# Patient Record
Sex: FEMALE | Race: OTHER | HISPANIC OR LATINO
[De-identification: names, ages, dates, MRNs, and addresses within clinical notes are randomized per-mention and may not be internally consistent; named-entity substitution may affect disease eponyms.]

---

## 2024-03-27 ENCOUNTER — NON-APPOINTMENT (OUTPATIENT)
Age: 39
End: 2024-03-27

## 2024-03-27 ENCOUNTER — APPOINTMENT (OUTPATIENT)
Dept: OBGYN | Facility: CLINIC | Age: 39
End: 2024-03-27
Payer: COMMERCIAL

## 2024-03-27 VITALS
DIASTOLIC BLOOD PRESSURE: 62 MMHG | OXYGEN SATURATION: 98 % | WEIGHT: 130 LBS | BODY MASS INDEX: 23.04 KG/M2 | HEART RATE: 77 BPM | SYSTOLIC BLOOD PRESSURE: 108 MMHG | HEIGHT: 63 IN

## 2024-03-27 DIAGNOSIS — Z83.3 FAMILY HISTORY OF DIABETES MELLITUS: ICD-10-CM

## 2024-03-27 DIAGNOSIS — Z80.3 FAMILY HISTORY OF MALIGNANT NEOPLASM OF BREAST: ICD-10-CM

## 2024-03-27 DIAGNOSIS — Z87.42 PERSONAL HISTORY OF OTHER DISEASES OF THE FEMALE GENITAL TRACT: ICD-10-CM

## 2024-03-27 DIAGNOSIS — Z87.09 PERSONAL HISTORY OF OTHER DISEASES OF THE RESPIRATORY SYSTEM: ICD-10-CM

## 2024-03-27 DIAGNOSIS — Z80.0 FAMILY HISTORY OF MALIGNANT NEOPLASM OF DIGESTIVE ORGANS: ICD-10-CM

## 2024-03-27 DIAGNOSIS — N60.19 DIFFUSE CYSTIC MASTOPATHY OF UNSPECIFIED BREAST: ICD-10-CM

## 2024-03-27 DIAGNOSIS — Z86.19 PERSONAL HISTORY OF OTHER INFECTIOUS AND PARASITIC DISEASES: ICD-10-CM

## 2024-03-27 DIAGNOSIS — Z01.419 ENCOUNTER FOR GYNECOLOGICAL EXAMINATION (GENERAL) (ROUTINE) W/OUT ABNORMAL FINDINGS: ICD-10-CM

## 2024-03-27 PROBLEM — Z00.00 ENCOUNTER FOR PREVENTIVE HEALTH EXAMINATION: Status: ACTIVE | Noted: 2024-03-27

## 2024-03-27 PROCEDURE — 99385 PREV VISIT NEW AGE 18-39: CPT

## 2024-03-27 NOTE — COUNSELING
[Contraception/ Emergency Contraception/ Safe Sexual Practices] : contraception, emergency contraception, safe sexual practices [HPV Vaccine] : HPV Vaccine [Lab Results] : lab results [Medication Management] : medication management

## 2024-03-27 NOTE — HISTORY OF PRESENT ILLNESS
[Patient reported PAP Smear was normal] : Patient reported PAP Smear was normal [Y] : Patient reports abnormal menses [N] : Patient denies prior pregnancies [Regular Cycle Intervals] : periods have been regular [Frequency: Q ___ days] : menstrual periods occur approximately every [unfilled] days [Menarche Age: ____] : age at menarche was [unfilled] [Previously active] : previously active [PapSmeardate] : 2017 [LMPDate] : 03/11/24 [MensesFreq] : 28 [MensesLength] : 5 [No] : never pregnant [TextBox_9] : 10 [Men] : men [FreeTextEntry1] : 03/11/24 [FreeTextEntry2] : in past used pull out

## 2024-03-27 NOTE — PHYSICAL EXAM
[Chaperone Present] : A chaperone was present in the examining room during all aspects of the physical examination [Appropriately responsive] : appropriately responsive [Alert] : alert [No Acute Distress] : no acute distress [Soft] : soft [Non-tender] : non-tender [Non-distended] : non-distended [No HSM] : No HSM [No Lesions] : no lesions [No Mass] : no mass [Oriented x3] : oriented x3 [Examination Of The Breasts] : a normal appearance [No Masses] : no breast masses were palpable [Labia Majora] : normal [Labia Minora] : normal [Normal] : normal [FreeTextEntry6] : cervix very deviated posteriorly (note has been told "uterus twisted") [Uterine Adnexae] : normal

## 2024-03-27 NOTE — PLAN
[FreeTextEntry1] : annual; Pap and hpv  not currently s/a last partner used: condoms/ withdrawal  hx of DVT on patch in past *refer to heme to get proper work up to see if clotting disorder dw pt to stay away from hormonal bc could consider copper iud

## 2024-03-28 LAB
C TRACH RRNA SPEC QL NAA+PROBE: NOT DETECTED
HPV HIGH+LOW RISK DNA PNL CVX: NOT DETECTED
N GONORRHOEA RRNA SPEC QL NAA+PROBE: NOT DETECTED
SOURCE TP AMPLIFICATION: NORMAL

## 2024-04-02 LAB — CYTOLOGY CVX/VAG DOC THIN PREP: NORMAL

## 2024-04-03 ENCOUNTER — APPOINTMENT (OUTPATIENT)
Dept: OBGYN | Facility: CLINIC | Age: 39
End: 2024-04-03
Payer: COMMERCIAL

## 2024-04-03 VITALS
DIASTOLIC BLOOD PRESSURE: 68 MMHG | WEIGHT: 130 LBS | HEIGHT: 63 IN | OXYGEN SATURATION: 97 % | SYSTOLIC BLOOD PRESSURE: 114 MMHG | HEART RATE: 86 BPM | BODY MASS INDEX: 23.04 KG/M2

## 2024-04-03 DIAGNOSIS — Z79.899 OTHER LONG TERM (CURRENT) DRUG THERAPY: ICD-10-CM

## 2024-04-03 DIAGNOSIS — N64.4 MASTODYNIA: ICD-10-CM

## 2024-04-03 DIAGNOSIS — D25.9 LEIOMYOMA OF UTERUS, UNSPECIFIED: ICD-10-CM

## 2024-04-03 PROCEDURE — 76830 TRANSVAGINAL US NON-OB: CPT

## 2024-04-03 PROCEDURE — 99213 OFFICE O/P EST LOW 20 MIN: CPT | Mod: 25

## 2024-04-03 NOTE — PROCEDURE
[Pelvic Mass] : pelvic mass [Fibroid Uterus] : fibroid uterus [Transvaginal Ultrasound] : transvaginal ultrasound [FreeTextEntry3] : large dominant posterior myoma , largest diameter 4.9 cm anterior to this normal uterus seen with normal EE no obvious adnexa masses [FreeTextEntry7] : follicles/ no obvious mass

## 2024-04-03 NOTE — HISTORY OF PRESENT ILLNESS
[FreeTextEntry1] : 39 yo patient presents for follow up : posterior pelvic fullness , here for sonogram and discussion

## 2024-04-03 NOTE — PHYSICAL EXAM
[Appropriately responsive] : appropriately responsive [Alert] : alert [No Acute Distress] : no acute distress [Soft] : soft [Non-tender] : non-tender [Non-distended] : non-distended [No Lesions] : no lesions [No HSM] : No HSM [No Mass] : no mass [Oriented x3] : oriented x3 [Labia Majora] : normal [Labia Minora] : normal [Normal] : normal [Uterine Adnexae] : normal [Enlarged ___ wks] : enlarged [unfilled] ~Uweeks [FreeTextEntry6] : posterior felt myoma: somewhat limited by pt tense abd muscles

## 2024-04-03 NOTE — PLAN
[FreeTextEntry1] : fibroid uterus:  imaging today max diameter 4.9cm d/w pt issues related to fibroid uterus. she states very long ago (10 plus years) she was told she had "tiny fibroid"  explained it is not "small" now max diameter appers to be 4.9 cm roughly 4.9 by 4.5 by 4.3 cm no obvious adnexa masses dw pt symptoms of fibroids: she denies any heavy menses/ pain mother had hysterectomy and then later both ovaries out: pt asked and mother stated it was NOT cancer  d/w pt myomas very rarely not benign, but to do short interval f/u here in a few months to make sure size is stable.

## 2024-04-08 ENCOUNTER — OUTPATIENT (OUTPATIENT)
Dept: OUTPATIENT SERVICES | Facility: HOSPITAL | Age: 39
LOS: 1 days | End: 2024-04-08

## 2024-04-08 ENCOUNTER — APPOINTMENT (OUTPATIENT)
Dept: MAMMOGRAPHY | Facility: CLINIC | Age: 39
End: 2024-04-08
Payer: COMMERCIAL

## 2024-04-08 ENCOUNTER — APPOINTMENT (OUTPATIENT)
Dept: ULTRASOUND IMAGING | Facility: CLINIC | Age: 39
End: 2024-04-08
Payer: COMMERCIAL

## 2024-04-08 ENCOUNTER — RESULT REVIEW (OUTPATIENT)
Age: 39
End: 2024-04-08

## 2024-04-08 PROCEDURE — G0279: CPT | Mod: 26

## 2024-04-08 PROCEDURE — 77066 DX MAMMO INCL CAD BI: CPT | Mod: 26

## 2024-04-08 PROCEDURE — 76641 ULTRASOUND BREAST COMPLETE: CPT | Mod: 26,50

## 2024-04-10 ENCOUNTER — APPOINTMENT (OUTPATIENT)
Dept: HEMATOLOGY ONCOLOGY | Facility: CLINIC | Age: 39
End: 2024-04-10
Payer: COMMERCIAL

## 2024-04-10 VITALS
BODY MASS INDEX: 23.04 KG/M2 | WEIGHT: 130 LBS | HEIGHT: 63 IN | TEMPERATURE: 98 F | DIASTOLIC BLOOD PRESSURE: 78 MMHG | OXYGEN SATURATION: 99 % | HEART RATE: 68 BPM | SYSTOLIC BLOOD PRESSURE: 122 MMHG

## 2024-04-10 DIAGNOSIS — Z82.61 FAMILY HISTORY OF ARTHRITIS: ICD-10-CM

## 2024-04-10 DIAGNOSIS — Z78.9 OTHER SPECIFIED HEALTH STATUS: ICD-10-CM

## 2024-04-10 DIAGNOSIS — Z86.718 PERSONAL HISTORY OF OTHER VENOUS THROMBOSIS AND EMBOLISM: ICD-10-CM

## 2024-04-10 DIAGNOSIS — Z82.49 FAMILY HISTORY OF ISCHEMIC HEART DISEASE AND OTHER DISEASES OF THE CIRCULATORY SYSTEM: ICD-10-CM

## 2024-04-10 LAB
APTT BLD: 30.6 SEC
INR PPP: 0.99
PT BLD: 11.3 SEC
RBC # BLD: 4.55 M/UL
RETICS # AUTO: 1.2 %
RETICS AGGREG/RBC NFR: 56.4 K/UL

## 2024-04-10 PROCEDURE — 36415 COLL VENOUS BLD VENIPUNCTURE: CPT

## 2024-04-10 PROCEDURE — 99204 OFFICE O/P NEW MOD 45 MIN: CPT

## 2024-04-10 RX ORDER — MAGNESIUM OXIDE/MAG AA CHELATE 300 MG
CAPSULE ORAL
Refills: 0 | Status: ACTIVE | COMMUNITY

## 2024-04-10 RX ORDER — COLLAGEN, HYDROLYSATE (BOVINE) 100 %
POWDER (GRAM) MISCELLANEOUS
Refills: 0 | Status: ACTIVE | COMMUNITY

## 2024-04-10 NOTE — REASON FOR VISIT
[Initial Consultation] : an initial consultation for [FreeTextEntry2] : History of deep vein thrombosis, family history of deep vein thrombosis, possible hypercoagulable state

## 2024-04-10 NOTE — ASSESSMENT
[FreeTextEntry1] : Patient is a 38-year-old female with a history of asthma in the past, DVT of the left lower extremity in 2015, and COVID 19 in September 2023 otherwise in good health who is referred for evaluation of possible hypercoagulable state. It seems that perhaps at that time, DVT was provoked by use of hormone patch and prolonged immobility at her computer.  However with a positive family history for thrombotic events, will evaluate for underlying hypercoagulable state. Will also evaluate nutritional studies for B12, folate, and iron as patient maintains a vegetarian diet and may need supplementation. Have ordered factor V Leiden mutation, prothrombin gene mutation, activated partial thromboplastin time, PT/INR, anticardiolipin antibody panel, beta-2 glycoprotein 1 antibody panel, Antithrombin III assay, CBC, manual differential, reticulocyte count, comprehensive metabolic panel, lupus anticoagulant studies, iron panel, ferritin, protein C activity/antigen, protein S activity and antigen.  A list of iron rich foods was given to the patient and discussed.  Patient was advised to call the office to discuss test results and further recommendations.

## 2024-04-10 NOTE — CONSULT LETTER
[Dear  ___] : Dear  [unfilled], [Consult Letter:] : I had the pleasure of evaluating your patient, [unfilled]. [( Thank you for referring [unfilled] for consultation for _____ )] : Thank you for referring [unfilled] for consultation for [unfilled] [Please see my note below.] : Please see my note below. [Consult Closing:] : Thank you very much for allowing me to participate in the care of this patient.  If you have any questions, please do not hesitate to contact me. [Sincerely,] : Sincerely, [FreeTextEntry3] : Jahaira Gore

## 2024-04-10 NOTE — REVIEW OF SYSTEMS
[Fatigue] : fatigue [Chest Pain] : chest pain [Negative] : Allergic/Immunologic [Fever] : no fever [Chills] : no chills [Night Sweats] : no night sweats [Recent Change In Weight] : ~T no recent weight change [Vision Problems] : no vision problems [Abdominal Pain] : no abdominal pain [Skin Rash] : no skin rash [Dizziness] : no dizziness [Muscle Weakness] : no muscle weakness [Easy Bleeding] : no tendency for easy bleeding [Easy Bruising] : no tendency for easy bruising [FreeTextEntry2] : sleeps poorly [FreeTextEntry5] : Occasional sharp pain midsternally  [FreeTextEntry9] : Back pain, ? restless leg

## 2024-04-10 NOTE — HISTORY OF PRESENT ILLNESS
[de-identified] : Patient is a 38-year-old female with a history of asthma in the past, DVT of the left lower extremity in 2015, and COVID 19 in September 2023 otherwise in good health who is referred for evaluation of possible hypercoagulable state.  Patient states that at the time of the DVT in 2015 she was using oral contraception in the form of a hormonal patch.  She was also in college and vaguely recalls sitting for prolonged periods of time at the computer.  She was treated with "blood thinners "for several months.  She is otherwise very active and exercises regularly.  She states that there is a family history of deep vein thrombosis in both grandmothers.  Patient states that since childhood she has maintained a vegetarian diet.  She takes no vitamin supplements.  Her periods are normal and not particularly heavy.  She does note thinning of the hair and fragility of fingernails.  She does not crave ice.  She does feel fatigued at times and attributes this to sleeping poorly.  She occasionally has brief episodes of midsternal chest pain which last few seconds and are not associated with other symptoms.  She sometimes has low back pain which she attributes to sitting at her computer.  She denies fever, chills, shortness of breath, abdominal pain, bleeding from nose or mouth or in urine or stool, recent or frequent infections.

## 2024-05-22 ENCOUNTER — NON-APPOINTMENT (OUTPATIENT)
Age: 39
End: 2024-05-22

## 2024-06-17 ENCOUNTER — NON-APPOINTMENT (OUTPATIENT)
Age: 39
End: 2024-06-17

## 2024-06-17 LAB
ALBUMIN SERPL ELPH-MCNC: 4.6 G/DL
ALP BLD-CCNC: 154 U/L
ALT SERPL-CCNC: 17 U/L
ANION GAP SERPL CALC-SCNC: 17 MMOL/L
APTT HEX PL PPP: NEGATIVE
AST SERPL-CCNC: 20 U/L
AT III PPP CHRO-ACNC: 126 %
B2 GLYCOPROT1 IGA SERPL IA-ACNC: 6.7 SAU
B2 GLYCOPROT1 IGG SER-ACNC: <5 SGU
B2 GLYCOPROT1 IGM SER-ACNC: 14.8 SMU
BASOPHILS # BLD AUTO: 0 K/UL
BASOPHILS # BLD AUTO: 0 K/UL
BASOPHILS NFR BLD AUTO: 0 %
BASOPHILS NFR BLD AUTO: 0 %
BILIRUB SERPL-MCNC: 0.2 MG/DL
BUN SERPL-MCNC: 11 MG/DL
CALCIUM SERPL-MCNC: 9.4 MG/DL
CARDIOLIPIN AB SER IA-ACNC: NEGATIVE
CARDIOLIPIN IGM SER-MCNC: 15.3 MPL
CARDIOLIPIN IGM SER-MCNC: <5 GPL
CHLORIDE SERPL-SCNC: 103 MMOL/L
CO2 SERPL-SCNC: 20 MMOL/L
CONFIRM: 30.3 SEC
CREAT SERPL-MCNC: 0.64 MG/DL
DEPRECATED CARDIOLIPIN IGA SER: <5 APL
DNA PLOIDY SPEC FC-IMP: NORMAL
DRVVT IMM 1:2 NP PPP: NORMAL
DRVVT SCREEN TO CONFIRM RATIO: 0.89 RATIO
EGFR: 117 ML/MIN/1.73M2
EOSINOPHIL # BLD AUTO: 0 K/UL
EOSINOPHIL # BLD AUTO: 0 K/UL
EOSINOPHIL NFR BLD AUTO: 0 %
EOSINOPHIL NFR BLD AUTO: 0 %
FERRITIN SERPL-MCNC: 8 NG/ML
FOLATE SERPL-MCNC: 7.6 NG/ML
GLUCOSE SERPL-MCNC: 82 MG/DL
HCT VFR BLD CALC: 37.8 %
HGB BLD-MCNC: 11.3 G/DL
HYPOCHROMIA BLD QL SMEAR: SLIGHT
IRON SATN MFR SERPL: 4 %
IRON SERPL-MCNC: 17 UG/DL
LYMPHOCYTES # BLD AUTO: 1.5 K/UL
LYMPHOCYTES # BLD AUTO: 1.5 K/UL
LYMPHOCYTES NFR BLD AUTO: 25.2 %
LYMPHOCYTES NFR BLD AUTO: 25.2 %
MAN DIFF?: NORMAL
MCHC RBC-ENTMCNC: 24.8 PG
MCHC RBC-ENTMCNC: 29.9 GM/DL
MCV RBC AUTO: 83.1 FL
MONOCYTES # BLD AUTO: 0.78 K/UL
MONOCYTES # BLD AUTO: 0.78 K/UL
MONOCYTES NFR BLD AUTO: 13.1 %
MONOCYTES NFR BLD AUTO: 13.1 %
MSMEAR: NORMAL
NEUTROPHILS # BLD AUTO: 3.67 K/UL
NEUTROPHILS # BLD AUTO: 3.67 K/UL
NEUTROPHILS NFR BLD AUTO: 61.7 %
NEUTROPHILS NFR BLD AUTO: 61.7 %
OVALOCYTES BLD QL SMEAR: SLIGHT
PLAT MORPH BLD: ABNORMAL
PLATELET # BLD AUTO: 392 K/UL
POIKILOCYTOSIS BLD QL SMEAR: SLIGHT
POLYCHROMASIA BLD QL SMEAR: SLIGHT
POTASSIUM SERPL-SCNC: 4.6 MMOL/L
PROT C PPP CHRO-ACNC: 98 %
PROT S AG ACT/NOR PPP IA: 110 %
PROT S FREE PPP-ACNC: 91 %
PROT SERPL-MCNC: 6.8 G/DL
PTR INTERP: NORMAL
RBC # BLD: 4.55 M/UL
RBC # FLD: 13.1 %
RBC BLD AUTO: ABNORMAL
SCREEN DRVVT: 27.1 SEC
SODIUM SERPL-SCNC: 140 MMOL/L
SPHEROCYTES BLD QL SMEAR: SLIGHT
TIBC SERPL-MCNC: 458 UG/DL
UIBC SERPL-MCNC: 441 UG/DL
VIT B12 SERPL-MCNC: 300 PG/ML
WBC # FLD AUTO: 5.95 K/UL

## 2024-09-05 ENCOUNTER — APPOINTMENT (OUTPATIENT)
Dept: HEMATOLOGY ONCOLOGY | Facility: CLINIC | Age: 39
End: 2024-09-05
Payer: COMMERCIAL

## 2024-09-05 VITALS
SYSTOLIC BLOOD PRESSURE: 112 MMHG | DIASTOLIC BLOOD PRESSURE: 62 MMHG | HEIGHT: 63 IN | TEMPERATURE: 97.5 F | HEART RATE: 67 BPM | OXYGEN SATURATION: 99 % | WEIGHT: 124 LBS | BODY MASS INDEX: 21.97 KG/M2

## 2024-09-05 DIAGNOSIS — D50.9 IRON DEFICIENCY ANEMIA, UNSPECIFIED: ICD-10-CM

## 2024-09-05 DIAGNOSIS — Z78.9 OTHER SPECIFIED HEALTH STATUS: ICD-10-CM

## 2024-09-05 DIAGNOSIS — E53.8 DEFICIENCY OF OTHER SPECIFIED B GROUP VITAMINS: ICD-10-CM

## 2024-09-05 DIAGNOSIS — R76.0 RAISED ANTIBODY TITER: ICD-10-CM

## 2024-09-05 LAB
RBC # BLD: 5.36 M/UL
RETICS # AUTO: 0.8 %
RETICS AGGREG/RBC NFR: 40.7 K/UL

## 2024-09-05 PROCEDURE — 99213 OFFICE O/P EST LOW 20 MIN: CPT | Mod: 25

## 2024-09-05 PROCEDURE — 36415 COLL VENOUS BLD VENIPUNCTURE: CPT

## 2024-09-05 RX ORDER — MULTIVITAMIN WITH IRON,LIVER 50MG-0.4MG
TABLET ORAL
Refills: 0 | Status: ACTIVE | COMMUNITY

## 2024-09-05 RX ORDER — CYANOCOBALAMIN (VITAMIN B-12) 1000 MCG
1000 TABLET ORAL
Refills: 0 | Status: ACTIVE | COMMUNITY

## 2024-09-05 NOTE — HISTORY OF PRESENT ILLNESS
[de-identified] : Patient is a 38-year-old female with a history of asthma in the past, DVT of the left lower extremity in 2015, and COVID 19 in September 2023 otherwise in good health initially referred for evaluation of possible hypercoagulable state who presents for follow up. At the initial consult in May of 2024, the CBC was remarkable for a hemoglobin of 11.3, hematocrit of 37.8 with a normal white count, platelet count and a normal differential. Iron panel was consistent with severe iron deficiency and the ferritin was low at 8. B12 was also low at 300 and the folate was 7.6. CMP revealed an alkaline phosphatase of 154 but was otherwise unremarkable. Hypercoagulable evaluation including factor V Leiden mutation, prothrombin gene mutation, Antithrombin III level, protein C&S panels, lupus anticoagulant were negative. Cardiolipin antibody IgM was indeterminate at 15.3 but the remainder of the cardiolipin antibodies and the beta-2 glycoprotein antibody panel were negative. Since the last visit, the patient had "lipo-burning" injections. Patient states that during that time she missed her period in May and had two menstrual cycles in June, but her periods are now regular. She is supplementing vitamin B12 1000 mcg, folic acid and iron with vitamin C which she tolerates well. She has a great deal of energy. Patient has lost 6 pounds since the last visit and continues to maintain a vegetarian diet. Today, the patient complains of a skin rash on her back but is otherwise feeling generally well. She is active by swimming and exercising. She denies fever, chills, shortness of breath, abdominal pain, bleeding from nose or mouth or in urine or stool, recent or frequent infections.

## 2024-09-05 NOTE — REASON FOR VISIT
[Follow-Up Visit] : a follow-up visit for [FreeTextEntry2] : Iron deficiency anemia, low serum B12 level, positive cardiolipin antibodies

## 2024-09-05 NOTE — REVIEW OF SYSTEMS
[Skin Rash] : skin rash [Negative] : Allergic/Immunologic [Chills] : no chills [Recent Change In Weight] : ~T no recent weight change [Eye Pain] : no eye pain [Vision Problems] : no vision problems [Loss of Hearing] : no loss of hearing [Nosebleeds] : no nosebleeds [Chest Pain] : no chest pain [Palpitations] : no palpitations [Shortness Of Breath] : no shortness of breath [Cough] : no cough [Abdominal Pain] : no abdominal pain [Joint Pain] : no joint pain [Joint Stiffness] : no joint stiffness [Easy Bleeding] : no tendency for easy bleeding [Easy Bruising] : no tendency for easy bruising [Swollen Glands] : no swollen glands [de-identified] : Rash on back

## 2024-09-05 NOTE — ASSESSMENT
[FreeTextEntry1] : Patient is a 38-year-old female with a history of asthma in the past, DVT of the left lower extremity in 2015, and COVID 19 in September 2023 otherwise in good health initially referred for evaluation of possible hypercoagulable state who presents for follow up of Iron deficiency and positive cardiolipin antibody..  Have ordered  B12 and folate, Beta-2 Glycoprotein 1 Antibodies, IgG, IgM and IgA, Cardiolipin Antibodies, IgG, IgM, and IgA, CBC with auto differential, CMP, ferritin, iron panel, manual differential and reticulocyte count. Venipuncture was performed in the office today. Patient was advised to call the office to discuss test results and further recommendations.

## 2024-09-06 LAB
ALBUMIN SERPL ELPH-MCNC: 4.8 G/DL
ALP BLD-CCNC: 138 U/L
ALT SERPL-CCNC: 16 U/L
ANION GAP SERPL CALC-SCNC: 14 MMOL/L
AST SERPL-CCNC: 33 U/L
BILIRUB SERPL-MCNC: 0.3 MG/DL
BUN SERPL-MCNC: 11 MG/DL
CALCIUM SERPL-MCNC: 9.7 MG/DL
CHLORIDE SERPL-SCNC: 102 MMOL/L
CO2 SERPL-SCNC: 23 MMOL/L
CREAT SERPL-MCNC: 0.68 MG/DL
EGFR: 115 ML/MIN/1.73M2
FERRITIN SERPL-MCNC: 21 NG/ML
FOLATE SERPL-MCNC: >20 NG/ML
GLUCOSE SERPL-MCNC: 72 MG/DL
POTASSIUM SERPL-SCNC: 4.5 MMOL/L
PROT SERPL-MCNC: 7.3 G/DL
SODIUM SERPL-SCNC: 139 MMOL/L
VIT B12 SERPL-MCNC: 546 PG/ML

## 2024-09-10 ENCOUNTER — APPOINTMENT (OUTPATIENT)
Dept: OBGYN | Facility: CLINIC | Age: 39
End: 2024-09-10
Payer: COMMERCIAL

## 2024-09-10 VITALS
WEIGHT: 127 LBS | HEART RATE: 80 BPM | DIASTOLIC BLOOD PRESSURE: 63 MMHG | BODY MASS INDEX: 22.5 KG/M2 | HEIGHT: 63 IN | SYSTOLIC BLOOD PRESSURE: 92 MMHG | OXYGEN SATURATION: 98 %

## 2024-09-10 DIAGNOSIS — Z86.718 PERSONAL HISTORY OF OTHER VENOUS THROMBOSIS AND EMBOLISM: ICD-10-CM

## 2024-09-10 DIAGNOSIS — Z30.09 ENCOUNTER FOR OTHER GENERAL COUNSELING AND ADVICE ON CONTRACEPTION: ICD-10-CM

## 2024-09-10 DIAGNOSIS — D25.9 LEIOMYOMA OF UTERUS, UNSPECIFIED: ICD-10-CM

## 2024-09-10 DIAGNOSIS — Z79.899 OTHER LONG TERM (CURRENT) DRUG THERAPY: ICD-10-CM

## 2024-09-10 PROCEDURE — 76830 TRANSVAGINAL US NON-OB: CPT

## 2024-09-10 PROCEDURE — 99214 OFFICE O/P EST MOD 30 MIN: CPT | Mod: 25

## 2024-09-10 NOTE — PHYSICAL EXAM
[Appropriately responsive] : appropriately responsive [Alert] : alert [No Acute Distress] : no acute distress [Soft] : soft [Non-tender] : non-tender [Non-distended] : non-distended [No Lesions] : no lesions [No Mass] : no mass [Labia Majora] : normal [Labia Minora] : normal [Normal] : normal [Mass ___ cm] : a [unfilled] ~Ucm uterine mass was palpated [Uterine Adnexae] : normal [FreeTextEntry6] : large posterior myoma

## 2024-09-10 NOTE — PLAN
[FreeTextEntry1] : here for gyn f/u 1. uterus with large posterior myoma max diameter 4.9 cm, stable in size  d/w pt implications of fibroid uterus she denies pain/ heavy menses will track menses   2. had questions about oocyte freezing d/w pt age related concerns advised to see SAMANTHA soon d/w pt some aspects of this process  3. birth control counseling: reviewed that she can't use ocp because hx of blood clot but that progestin only pill could be an option  Over 50% of face to face time of visit counseling and coordination of care (total 30m)

## 2024-09-10 NOTE — PROCEDURE
[Fibroid Uterus] : fibroid uterus [Transvaginal Ultrasound] : transvaginal ultrasound [Anteverted] : anteverted [Not Visualized] : not visualized [FreeTextEntry3] : uterus with large posterior myoma max diameter 4.9 cm, stable in size  [FreeTextEntry7] : unable to see (fibroid n way)

## 2024-09-11 ENCOUNTER — NON-APPOINTMENT (OUTPATIENT)
Age: 39
End: 2024-09-11

## 2024-09-11 LAB
B2 GLYCOPROT1 IGA SERPL IA-ACNC: <2 U/ML
B2 GLYCOPROT1 IGG SER-ACNC: 1.6 U/ML
B2 GLYCOPROT1 IGM SER-ACNC: 25.7 U/ML
BASOPHILS # BLD AUTO: 0 K/UL
BASOPHILS # BLD AUTO: 0 K/UL
BASOPHILS NFR BLD AUTO: 0 %
BASOPHILS NFR BLD AUTO: 0 %
CARDIOLIPIN IGM SER-MCNC: 2 GPL U/ML
CARDIOLIPIN IGM SER-MCNC: 36.3 MPL U/ML
DEPRECATED CARDIOLIPIN IGA SER: <2 APL U/ML
EOSINOPHIL # BLD AUTO: 0 K/UL
EOSINOPHIL # BLD AUTO: 0 K/UL
EOSINOPHIL NFR BLD AUTO: 0 %
EOSINOPHIL NFR BLD AUTO: 0 %
HCT VFR BLD CALC: 45.1 %
HGB BLD-MCNC: 13.8 G/DL
IRON SATN MFR SERPL: 15 %
IRON SERPL-MCNC: 66 UG/DL
LYMPHOCYTES # BLD AUTO: 2.5 K/UL
LYMPHOCYTES # BLD AUTO: 2.5 K/UL
LYMPHOCYTES NFR BLD AUTO: 35.1 %
LYMPHOCYTES NFR BLD AUTO: 35.1 %
MAN DIFF?: NORMAL
MCHC RBC-ENTMCNC: 25.7 PG
MCHC RBC-ENTMCNC: 30.6 GM/DL
MCV RBC AUTO: 84.1 FL
MONOCYTES # BLD AUTO: 0.38 K/UL
MONOCYTES # BLD AUTO: 0.38 K/UL
MONOCYTES NFR BLD AUTO: 5.3 %
MONOCYTES NFR BLD AUTO: 5.3 %
MSMEAR: NORMAL
NEUTROPHILS # BLD AUTO: 4.24 K/UL
NEUTROPHILS # BLD AUTO: 4.24 K/UL
NEUTROPHILS NFR BLD AUTO: 59.6 %
NEUTROPHILS NFR BLD AUTO: 59.6 %
OVALOCYTES BLD QL SMEAR: SLIGHT
PLAT MORPH BLD: ABNORMAL
PLATELET # BLD AUTO: 341 K/UL
POIKILOCYTOSIS BLD QL SMEAR: SLIGHT
RBC # BLD: 5.36 M/UL
RBC # FLD: 15.9 %
RBC BLD AUTO: ABNORMAL
TIBC SERPL-MCNC: 437 UG/DL
UIBC SERPL-MCNC: 371 UG/DL
WBC # FLD AUTO: 7.11 K/UL

## 2025-03-03 ENCOUNTER — APPOINTMENT (OUTPATIENT)
Dept: OBGYN | Facility: CLINIC | Age: 40
End: 2025-03-03